# Patient Record
Sex: FEMALE | Race: WHITE | URBAN - METROPOLITAN AREA
[De-identification: names, ages, dates, MRNs, and addresses within clinical notes are randomized per-mention and may not be internally consistent; named-entity substitution may affect disease eponyms.]

---

## 2022-03-18 PROBLEM — N81.10 BADEN-WALKER GRADE 3 CYSTOCELE: Status: ACTIVE | Noted: 2018-08-07

## 2022-03-19 PROBLEM — N81.6 BADEN-WALKER GRADE 2 RECTOCELE: Status: ACTIVE | Noted: 2018-08-07

## 2022-03-20 PROBLEM — N81.2 SECOND DEGREE UTERINE PROLAPSE: Status: ACTIVE | Noted: 2018-08-07

## 2022-07-28 ENCOUNTER — TELEPHONE (OUTPATIENT)
Dept: OBGYN CLINIC | Age: 72
End: 2022-07-28

## 2022-07-28 NOTE — TELEPHONE ENCOUNTER
Patient called with c/o vaginal itching-she states same symptoms she was seen for back in march 2022. She denies any UTI symptoms.   Per Dr Jaz Vicente, she will send in flagyl and clobetasol cream.

## 2022-07-28 NOTE — TELEPHONE ENCOUNTER
Pt called requesting to speak with Dr. Byron Finn nurse. Called pt back, pt requested to speak directly to nurse of Dr. Aileen Delarosa. Asked pt if I could take a message to her nurse; pt declined, stating business was private. Pt's request brought to Farheen Lange for follow up.

## 2022-07-29 RX ORDER — CLOBETASOL PROPIONATE 0.5 MG/G
0.05 OINTMENT TOPICAL 2 TIMES DAILY
Qty: 15 G | Refills: 1 | Status: SHIPPED | OUTPATIENT
Start: 2022-07-29

## 2022-07-29 RX ORDER — METRONIDAZOLE 500 MG/1
500 TABLET ORAL 2 TIMES DAILY
Qty: 14 TABLET | Refills: 0 | Status: SHIPPED | OUTPATIENT
Start: 2022-07-29 | End: 2022-08-05

## 2022-11-15 ENCOUNTER — OFFICE VISIT (OUTPATIENT)
Dept: OBGYN CLINIC | Age: 72
End: 2022-11-15
Payer: MEDICARE

## 2022-11-15 VITALS — DIASTOLIC BLOOD PRESSURE: 96 MMHG | SYSTOLIC BLOOD PRESSURE: 158 MMHG | BODY MASS INDEX: 20.05 KG/M2 | WEIGHT: 128 LBS

## 2022-11-15 DIAGNOSIS — N81.4 UTEROVAGINAL PROLAPSE: ICD-10-CM

## 2022-11-15 DIAGNOSIS — N95.2 POSTMENOPAUSAL ATROPHIC VAGINITIS: ICD-10-CM

## 2022-11-15 DIAGNOSIS — N81.2 INCOMPLETE UTEROVAGINAL PROLAPSE: ICD-10-CM

## 2022-11-15 DIAGNOSIS — I10 HYPERTENSION, UNSPECIFIED TYPE: ICD-10-CM

## 2022-11-15 DIAGNOSIS — Z01.419 ENCOUNTER FOR WELL WOMAN EXAM WITH ROUTINE GYNECOLOGICAL EXAM: Primary | ICD-10-CM

## 2022-11-15 DIAGNOSIS — Z46.89 PESSARY MAINTENANCE: ICD-10-CM

## 2022-11-15 PROCEDURE — 3075F SYST BP GE 130 - 139MM HG: CPT | Performed by: OBSTETRICS & GYNECOLOGY

## 2022-11-15 PROCEDURE — 3079F DIAST BP 80-89 MM HG: CPT | Performed by: OBSTETRICS & GYNECOLOGY

## 2022-11-15 PROCEDURE — G0101 CA SCREEN;PELVIC/BREAST EXAM: HCPCS | Performed by: OBSTETRICS & GYNECOLOGY

## 2022-11-15 PROCEDURE — G8484 FLU IMMUNIZE NO ADMIN: HCPCS | Performed by: OBSTETRICS & GYNECOLOGY

## 2022-11-15 RX ORDER — ESTRADIOL 0.1 MG/G
CREAM VAGINAL
Qty: 42.5 G | Refills: 5 | Status: SHIPPED | OUTPATIENT
Start: 2022-11-15

## 2022-11-15 NOTE — PROGRESS NOTES
Annual Exam  Established ptPaige oTrres   71 y.o.  1950  [id number]    Today:  11/15/2022    Presents for well woman annual exam.  Reports:  Her pessary is working well for her, she Donny Wang takes it out\". Reports some genital/inner thigh chaffing, rides horses. Discussed otc chafing products, continue vag'l ext'l genitalia estrogen. Refuses colonoscopy & MMG    BC:   post menopausal status. Past Medical History:   Diagnosis Date    Broken rib     History of broken collarbone        Past Surgical History:   Procedure Laterality Date    APPENDECTOMY         History reviewed. No pertinent family history. OB History    Para Term  AB Living   1 0 1 0 0 1   SAB IAB Ectopic Molar Multiple Live Births   0 0 0 0 0 0       Social History     Socioeconomic History    Marital status:      Spouse name: None    Number of children: None    Years of education: None    Highest education level: None   Tobacco Use    Smoking status: Never    Smokeless tobacco: Never   Substance and Sexual Activity    Alcohol use: No    Drug use: No       Current Outpatient Medications   Medication Sig    estradiol (ESTRACE VAGINAL) 0.1 MG/GM vaginal cream 0.5 gram intravaginal then 1/2 inch of cream to the external genitalia including the outer and inner labia, just inside the vagina, the perineum (area between the vagina and anus) and the perianal region. Apply nightly 2-3 x/week. estradiol (ESTRACE) 0.1 MG/GM vaginal cream Apply a thin layer to the affected area nightly for 2 weeks. Then 2-3 times a week    clobetasol (TEMOVATE) 0.05 % ointment Apply 0.05 each topically 2 times daily Apply topically 2 times daily. (Patient not taking: Reported on 11/15/2022)    sulfamethoxazole-trimethoprim (BACTRIM DS;SEPTRA DS) 800-160 MG per tablet Take 1 tablet by mouth 2 times daily (Patient not taking: Reported on 11/15/2022)     No current facility-administered medications for this visit.        Review of Systems Skin:         chafing     Updated from patient's \"Review of Systems\" form that patient completed. Physical Exam:   BP (!) 158/96   Wt 128 lb (58.1 kg)   BMI 20.05 kg/m² , No LMP recorded. Patient is postmenopausal.    Patient is a 71 y.o. female who appears pleasant, in no apparent distress, her given age, well developed, well nourished and with good attention to hygiene and body habitus. Oriented to person, place and time. Mood and affect normal and appropriate to situation. Head is normocephalic, atraumatic, without any gross head or neck masses. Neck: Neck exam reveals no abnormalities. Thyroid ~ 4-5 cm/symmetric, no abnormalities. Lymph nodes:   Neck lymph nodes are normal.   No supraclavicular lymphadenopathy noted. No axillary lymphadenopathy noted. No groin lymphadenopathy noted. Respiratory: Assessment of respiratory effort reveals even and non labored respirations. Lungs CTA. Cardiovascular: Heart auscultation reveals no murmurs, gallop, rubs or clicks. Skin: No skin rash, abnormal appearing nevi, subcutaneous nodules, lesions or ulcers observed. Abdomen: Abdomen soft, non tender without palpable masses. Palpation of liver reveals no abnormalities with respect to size, tenderness or masses. Palpation of spleen reveals no abnormalities with respect to size, tenderness or masses. Breast: Symmetrical, no: dimpling, retractions, adenopathy, dominant masses or nipple discharge elicited. Breasts show no palpable masses or tenderness. Bilateral Fibrocystic change is:  diffusely minimal except left breast 4:00 to 6:00 quadrant, FC diffusely more prominent in this region  No changes suspicious for malignancy      Genitourinary:  External genitalia are anatomically normal in appearance. Diffuse atrophic changes consistent with age and hormonal status noted throughout her SSE.     Ring pessary removed and cleaned  Examination of urethral meatus reveals location normal referred back to her PCP or appropriate specialist.     Wt Readings from Last 3 Encounters: Wt Readings from Last 3 Encounters:   11/15/22 128 lb (58.1 kg)   05/12/22 153 lb (69.4 kg)   04/11/22 149 lb (67.6 kg)       BP Readings from Last 3 Encounters:  BP Readings from Last 3 Encounters:   11/15/22 (!) 158/96   05/12/22 (!) 140/84   04/11/22 132/80   3/22/22           134/84    11/20/2022  Addendum, staff message sent to [facility] staff:  Call patient, let her know I want her to keep an eye on her BP. She can  a BP monitor at any pharmacy/[employer]. Her BPs have been elevated at her recent office visits, I know she attributes it to whitecoat hypertension but is possible that she has underlying hypertension. Encourage her to track her BP twice a day. If the systolic blood pressure is consistently above 306 and/or diastolic BP consistently greater than 95 she needs to establish care with a primary care physician who can do the appropriate work-up and treat if needed to prevent future cardiovascular disease/stroke. BP Readings from Last 4 Encounters:  11/15/22 158/96 *  05/12/22 140/84 *  04/11/22 132/80  3/22/22           134/84           Diagnosis Orders   1. Encounter for well woman exam with routine gynecological exam  PAP IG, Aptima HPV and rfx 16/18,45 ([id number])    PAP IG, Aptima HPV and rfx 16/18,45 ([id number])      2. Incomplete uterovaginal prolapse        3. Pessary maintenance  estradiol (ESTRACE VAGINAL) 0.1 MG/GM vaginal cream      4. Postmenopausal atrophic vaginitis  estradiol (ESTRACE VAGINAL) 0.1 MG/GM vaginal cream          Orders Placed This Encounter   Procedures    PAP IG, Aptima HPV and rfx 16/18,45 ([id number])       Dictated using voice recognition software.   Proofread but unrecognized errors may exist.    Signed by:  Vani Boyce MD, Mee Keith

## 2022-11-20 PROBLEM — N81.4 UTEROVAGINAL PROLAPSE: Status: ACTIVE | Noted: 2022-11-20

## 2022-11-20 PROBLEM — Z46.89 ENCOUNTER FOR FITTING AND ADJUSTMENT OF OTHER SPECIFIED DEVICES: Status: ACTIVE | Noted: 2022-11-20

## 2022-11-20 ASSESSMENT — ENCOUNTER SYMPTOMS: ROS SKIN COMMENTS: CHAFING

## 2022-11-25 LAB
CYTOLOGIST CVX/VAG CYTO: NORMAL
CYTOLOGY CVX/VAG DOC THIN PREP: NORMAL
HPV APTIMA: NEGATIVE
HPV GENOTYPE REFLEX: NORMAL
Lab: NORMAL
Lab: NORMAL
PATH REPORT.FINAL DX SPEC: NORMAL
STAT OF ADQ CVX/VAG CYTO-IMP: NORMAL

## 2022-12-28 NOTE — RESULT ENCOUNTER NOTE
Notify pt, pap unsatisfactory w/neg HPV. Not a concern b/c her 3/2022 pap was nl, satisfactory w/ + ECC and neg HPV. Have pt pre-medicate w/ Estrace vaginal cream  0.5 gram intravaginal nightly x 4 wks prior any f/u pap smears. And, Cytotec 200 mcg po nightly for 2 nights prior to any repeat paps. Thanks.

## 2023-01-10 RX ORDER — MISOPROSTOL 100 UG/1
200 TABLET ORAL
Qty: 4 TABLET | Refills: 0 | Status: SHIPPED | OUTPATIENT
Start: 2023-01-10 | End: 2023-01-12

## 2023-01-10 RX ORDER — ESTRADIOL 0.1 MG/G
0.5 CREAM VAGINAL
Qty: 1 EACH | Refills: 3 | Status: SHIPPED | OUTPATIENT
Start: 2023-01-10

## 2023-06-20 ENCOUNTER — OFFICE VISIT (OUTPATIENT)
Dept: OBGYN CLINIC | Age: 73
End: 2023-06-20

## 2023-06-20 VITALS
SYSTOLIC BLOOD PRESSURE: 158 MMHG | DIASTOLIC BLOOD PRESSURE: 90 MMHG | HEIGHT: 67 IN | BODY MASS INDEX: 20.4 KG/M2 | WEIGHT: 130 LBS

## 2023-06-20 DIAGNOSIS — Z46.89 ENCOUNTER FOR FITTING AND ADJUSTMENT OF PESSARY: ICD-10-CM

## 2023-06-20 DIAGNOSIS — N81.2 INCOMPLETE UTEROVAGINAL PROLAPSE: ICD-10-CM

## 2023-06-20 DIAGNOSIS — N95.2 ATROPHIC VAGINITIS: ICD-10-CM

## 2023-06-20 DIAGNOSIS — N84.1 CERVICAL POLYP: ICD-10-CM

## 2023-06-20 DIAGNOSIS — L29.3 GENITAL PRURITUS: ICD-10-CM

## 2023-06-20 DIAGNOSIS — Z12.4 SCREENING FOR CERVICAL CANCER: Primary | ICD-10-CM

## 2023-06-28 LAB
CYTOLOGIST CVX/VAG CYTO: NORMAL
CYTOLOGY CVX/VAG DOC THIN PREP: NORMAL
HPV APTIMA: NEGATIVE
Lab: NORMAL
PATH REPORT.FINAL DX SPEC: NORMAL
PATHOLOGIST CVX/VAG CYTO: NORMAL
STAT OF ADQ CVX/VAG CYTO-IMP: NORMAL

## 2023-06-29 ENCOUNTER — TELEPHONE (OUTPATIENT)
Dept: OBGYN CLINIC | Age: 73
End: 2023-06-29

## 2023-06-29 DIAGNOSIS — N76.0 BV (BACTERIAL VAGINOSIS): Primary | ICD-10-CM

## 2023-06-29 DIAGNOSIS — B96.89 BV (BACTERIAL VAGINOSIS): Primary | ICD-10-CM

## 2023-06-30 RX ORDER — ESTRADIOL 0.1 MG/G
CREAM VAGINAL
Qty: 42.5 G | Refills: 3 | Status: SHIPPED | OUTPATIENT
Start: 2023-06-30

## 2023-06-30 RX ORDER — CLOBETASOL PROPIONATE 0.5 MG/G
OINTMENT TOPICAL
Qty: 45 G | Refills: 0 | Status: SHIPPED | OUTPATIENT
Start: 2023-06-30

## 2023-06-30 RX ORDER — METRONIDAZOLE 7.5 MG/G
1 GEL VAGINAL DAILY
Qty: 70 G | Refills: 0 | Status: SHIPPED | OUTPATIENT
Start: 2023-06-30 | End: 2023-07-05

## 2023-07-19 ENCOUNTER — TELEPHONE (OUTPATIENT)
Dept: OBGYN CLINIC | Age: 73
End: 2023-07-19

## 2023-07-19 NOTE — TELEPHONE ENCOUNTER
Called patient to inform appointment for pessary fitting on 7/20 needs to be cancelled  d/t not having the appropriate devices for fitting. Patient was very vocal about her frustration over this situation. She requests that the Dr. Mariusz Riverale her records and find out what type of pessary she had previously because that one fit perfectly. Patient refuses to be referred anywhere else. I informed her I would pass along her concerns and patient's response was \"I won't hold my breath\".

## 2024-02-21 ENCOUNTER — TELEPHONE (OUTPATIENT)
Dept: OBGYN CLINIC | Age: 74
End: 2024-02-21

## 2024-02-21 DIAGNOSIS — R35.0 FREQUENT URINATION: Primary | ICD-10-CM

## 2024-02-21 DIAGNOSIS — Z46.89 ENCOUNTER FOR FITTING AND ADJUSTMENT OF PESSARY: ICD-10-CM

## 2024-02-22 ENCOUNTER — NURSE ONLY (OUTPATIENT)
Dept: OBGYN CLINIC | Age: 74
End: 2024-02-22
Payer: MEDICARE

## 2024-02-22 DIAGNOSIS — R35.0 FREQUENT URINATION: Primary | ICD-10-CM

## 2024-02-22 DIAGNOSIS — R35.0 FREQUENT URINATION: ICD-10-CM

## 2024-02-22 LAB
BILIRUBIN, URINE, POC: NEGATIVE
BLOOD URINE, POC: NEGATIVE
GLUCOSE URINE, POC: NEGATIVE
KETONES, URINE, POC: NEGATIVE
LEUKOCYTE ESTERASE, URINE, POC: NEGATIVE
NITRITE, URINE, POC: NEGATIVE
PH, URINE, POC: 7.5 (ref 4.6–8)
PROTEIN,URINE, POC: NEGATIVE
SPECIFIC GRAVITY, URINE, POC: 1.01 (ref 1–1.03)
URINALYSIS CLARITY, POC: CLEAR
URINALYSIS COLOR, POC: YELLOW
UROBILINOGEN, POC: NORMAL

## 2024-02-22 PROCEDURE — 81003 URINALYSIS AUTO W/O SCOPE: CPT | Performed by: OBSTETRICS & GYNECOLOGY

## 2024-02-22 RX ORDER — PHENAZOPYRIDINE HYDROCHLORIDE 100 MG/1
100 TABLET, FILM COATED ORAL 3 TIMES DAILY PRN
Qty: 9 TABLET | Refills: 0 | Status: SHIPPED | OUTPATIENT
Start: 2024-02-22 | End: 2024-02-25

## 2024-02-22 RX ORDER — SULFAMETHOXAZOLE AND TRIMETHOPRIM 800; 160 MG/1; MG/1
1 TABLET ORAL 2 TIMES DAILY
Qty: 14 TABLET | Refills: 0 | Status: CANCELLED | OUTPATIENT
Start: 2024-02-22 | End: 2024-02-29

## 2024-02-22 RX ORDER — PHENAZOPYRIDINE HYDROCHLORIDE 100 MG/1
100 TABLET, FILM COATED ORAL 3 TIMES DAILY PRN
Qty: 9 TABLET | Refills: 0 | Status: CANCELLED | OUTPATIENT
Start: 2024-02-22 | End: 2024-02-25

## 2024-02-22 RX ORDER — SULFAMETHOXAZOLE AND TRIMETHOPRIM 800; 160 MG/1; MG/1
1 TABLET ORAL 2 TIMES DAILY
Qty: 14 TABLET | Refills: 0 | Status: SHIPPED | OUTPATIENT
Start: 2024-02-22 | End: 2024-02-29

## 2024-02-22 NOTE — PROGRESS NOTES
Pt presents to the office for long dip urine and urine culture. Pt c/o having to urinate every 5 minutes and states this does not work with her riding her horses and having to be on her tractor. Urine long dip in the office was negative. See results tab for details. Urine culture sent.    Pt asked about records being sent with referral from yesterday because the girl (Yulia) who checked her in did not know what she was talking about. Explained to patient that I was the person she spoke with yesterday and that I put the referral in for her. Yulia, front office check in, then came to me and said pt had questions about referral from yesterday and said the patient stated \"because the black girl she spoke with yesterday on the phone did not know and she could not understand and the English she spoke\". I then asked pt did she have any questions from yesterday since she said she did not understand me. Pt states she does not have anymore questions and that she did not say that.    I explained all this to Dr. Lemus and let her know that the urine long dip was negative. Per Dr. Lemus send culture and send in Bactrim  mg, BID x7days and pyridium 100mg TID x 3days. Dr. Lemus  also advises patient to see Dr. Reynolds for a procedure called a [place] Colpocleisis to help with vaginal prolapse. Offered this to patient and she declined, and states \"she does not do surgery and she does not want to not be able to ride her horses\". I gave pt the name and number to Meg Napoles NP who she was referred to yesterday, so that she can reach out about being seen sooner. Pt left the office upset and stated while walking off that \"here she is left again with no answers at all and she has been dealing with this for 9 months\".     Bactrim and Pyridium pended for SHEBA Mullen signature

## 2024-02-24 LAB
BACTERIA SPEC CULT: NORMAL
SERVICE CMNT-IMP: NORMAL